# Patient Record
Sex: MALE | Race: WHITE | Employment: FULL TIME | URBAN - METROPOLITAN AREA
[De-identification: names, ages, dates, MRNs, and addresses within clinical notes are randomized per-mention and may not be internally consistent; named-entity substitution may affect disease eponyms.]

---

## 2020-12-14 ENCOUNTER — LAB ENCOUNTER (OUTPATIENT)
Dept: LAB | Age: 30
End: 2020-12-14
Attending: PREVENTIVE MEDICINE
Payer: COMMERCIAL

## 2020-12-14 DIAGNOSIS — Z20.822 ENCOUNTER FOR PREPROCEDURE SCREENING LABORATORY TESTING FOR COVID-19: ICD-10-CM

## 2020-12-14 DIAGNOSIS — Z01.812 ENCOUNTER FOR PREPROCEDURE SCREENING LABORATORY TESTING FOR COVID-19: ICD-10-CM

## 2020-12-17 ENCOUNTER — OFFICE VISIT (OUTPATIENT)
Dept: OCCUPATIONAL MEDICINE | Age: 30
End: 2020-12-17
Attending: PHYSICIAN ASSISTANT

## 2020-12-17 ENCOUNTER — HOSPITAL ENCOUNTER (OUTPATIENT)
Dept: GENERAL RADIOLOGY | Age: 30
Discharge: HOME | End: 2020-12-17
Attending: PHYSICIAN ASSISTANT

## 2020-12-17 ENCOUNTER — HOSPITAL ENCOUNTER (OUTPATIENT)
Dept: CV DIAGNOSTICS | Facility: HOSPITAL | Age: 30
Discharge: HOME | End: 2020-12-17
Attending: PREVENTIVE MEDICINE

## 2020-12-17 DIAGNOSIS — Z02.1 PHYSICAL EXAM, PRE-EMPLOYMENT: ICD-10-CM

## 2020-12-17 DIAGNOSIS — Z02.1 PHYSICAL EXAM, PRE-EMPLOYMENT: Primary | ICD-10-CM

## 2020-12-17 PROCEDURE — 86480 TB TEST CELL IMMUN MEASURE: CPT

## 2020-12-17 PROCEDURE — 81003 URINALYSIS AUTO W/O SCOPE: CPT

## 2020-12-17 PROCEDURE — 80053 COMPREHEN METABOLIC PANEL: CPT

## 2020-12-17 PROCEDURE — 85025 COMPLETE CBC W/AUTO DIFF WBC: CPT

## 2020-12-23 ENCOUNTER — APPOINTMENT (OUTPATIENT)
Dept: OTHER | Facility: HOSPITAL | Age: 30
End: 2020-12-23
Attending: PREVENTIVE MEDICINE

## 2020-12-28 ENCOUNTER — OCC HEALTH (OUTPATIENT)
Dept: OTHER | Facility: HOSPITAL | Age: 30
End: 2020-12-28
Attending: PREVENTIVE MEDICINE

## 2020-12-28 DIAGNOSIS — Z02.1 PHYSICAL EXAM, PRE-EMPLOYMENT: Primary | ICD-10-CM

## 2020-12-28 PROCEDURE — 80061 LIPID PANEL: CPT

## 2021-07-29 ENCOUNTER — HOSPITAL ENCOUNTER (EMERGENCY)
Facility: HOSPITAL | Age: 31
Discharge: HOME | End: 2021-07-29
Attending: EMERGENCY MEDICINE
Payer: OTHER MISCELLANEOUS

## 2021-07-29 ENCOUNTER — APPOINTMENT (OUTPATIENT)
Dept: GENERAL RADIOLOGY | Facility: HOSPITAL | Age: 31
End: 2021-07-29
Attending: EMERGENCY MEDICINE
Payer: OTHER MISCELLANEOUS

## 2021-07-29 VITALS
BODY MASS INDEX: 26.66 KG/M2 | SYSTOLIC BLOOD PRESSURE: 121 MMHG | WEIGHT: 180 LBS | OXYGEN SATURATION: 97 % | TEMPERATURE: 98 F | RESPIRATION RATE: 16 BRPM | DIASTOLIC BLOOD PRESSURE: 88 MMHG | HEART RATE: 84 BPM | HEIGHT: 69 IN

## 2021-07-29 DIAGNOSIS — S60.221A CONTUSION OF RIGHT HAND, INITIAL ENCOUNTER: Primary | ICD-10-CM

## 2021-07-29 PROCEDURE — 99283 EMERGENCY DEPT VISIT LOW MDM: CPT

## 2021-07-29 PROCEDURE — 73130 X-RAY EXAM OF HAND: CPT | Performed by: EMERGENCY MEDICINE
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
## 2021-08-20 ENCOUNTER — APPOINTMENT (OUTPATIENT)
Dept: OTHER | Facility: HOSPITAL | Age: 31
End: 2021-08-20
Attending: FAMILY MEDICINE

## 2021-11-15 ENCOUNTER — APPOINTMENT (OUTPATIENT)
Dept: OTHER | Facility: HOSPITAL | Age: 31
End: 2021-11-15
Attending: PREVENTIVE MEDICINE

## 2022-01-21 ENCOUNTER — OCC HEALTH (OUTPATIENT)
Dept: OTHER | Facility: HOSPITAL | Age: 32
End: 2022-01-21
Attending: PREVENTIVE MEDICINE

## 2022-01-21 DIAGNOSIS — Z77.21 EXPOSURE TO BLOOD OR BODY FLUID: Primary | ICD-10-CM

## 2022-01-21 DIAGNOSIS — Z77.21 EXPOSURE TO BLOOD-BORNE PATHOGEN: ICD-10-CM

## 2022-01-21 LAB
HBV SURFACE AB SER QL: REACTIVE
HBV SURFACE AB SERPL IA-ACNC: 612.59 MIU/ML
HCV AB SERPL QL IA: NONREACTIVE

## 2022-01-21 PROCEDURE — 86803 HEPATITIS C AB TEST: CPT

## 2022-01-21 PROCEDURE — 87389 HIV-1 AG W/HIV-1&-2 AB AG IA: CPT

## 2022-01-21 PROCEDURE — 86706 HEP B SURFACE ANTIBODY: CPT

## 2022-10-18 ENCOUNTER — APPOINTMENT (OUTPATIENT)
Dept: OTHER | Facility: HOSPITAL | Age: 32
End: 2022-10-18
Attending: PREVENTIVE MEDICINE

## (undated) NOTE — LETTER
Date & Time: 7/29/2021, 5:57 PM  Patient: Nirmala [place]  Encounter Provider(s):    Tobias Cardenas MD       To Whom It May Concern:    Noel Rosas was seen and treated in our department on 7/29/2021. He can return to work on 8/2/2021.     If you hav